# Patient Record
Sex: FEMALE | Race: WHITE | NOT HISPANIC OR LATINO | URBAN - METROPOLITAN AREA
[De-identification: names, ages, dates, MRNs, and addresses within clinical notes are randomized per-mention and may not be internally consistent; named-entity substitution may affect disease eponyms.]

---

## 2019-08-30 ENCOUNTER — APPOINTMENT (OUTPATIENT)
Age: 54
Setting detail: DERMATOLOGY
End: 2019-08-30

## 2019-08-30 DIAGNOSIS — D485 NEOPLASM OF UNCERTAIN BEHAVIOR OF SKIN: ICD-10-CM

## 2019-08-30 DIAGNOSIS — D18.0 HEMANGIOMA: ICD-10-CM

## 2019-08-30 PROBLEM — L90.0 LICHEN SCLEROSUS ET ATROPHICUS: Status: ACTIVE | Noted: 2019-08-30

## 2019-08-30 PROBLEM — D48.5 NEOPLASM OF UNCERTAIN BEHAVIOR OF SKIN: Status: ACTIVE | Noted: 2019-08-30

## 2019-08-30 PROBLEM — D18.01 HEMANGIOMA OF SKIN AND SUBCUTANEOUS TISSUE: Status: ACTIVE | Noted: 2019-08-30

## 2019-08-30 PROCEDURE — OTHER PRESCRIPTION: OTHER

## 2019-08-30 PROCEDURE — 11102 TANGNTL BX SKIN SINGLE LES: CPT

## 2019-08-30 PROCEDURE — OTHER BIOPSY BY SHAVE METHOD: OTHER

## 2019-08-30 PROCEDURE — OTHER COUNSELING: OTHER

## 2019-08-30 PROCEDURE — OTHER MEDICATION COUNSELING: OTHER

## 2019-08-30 PROCEDURE — 99202 OFFICE O/P NEW SF 15 MIN: CPT | Mod: 25

## 2019-08-30 RX ORDER — CLOBETASOL PROPIONATE 0.5 MG/G
OINTMENT TOPICAL
Qty: 1 | Refills: 0 | Status: ERX | COMMUNITY
Start: 2019-08-30

## 2019-08-30 ASSESSMENT — LOCATION ZONE DERM
LOCATION ZONE: TRUNK
LOCATION ZONE: ARM

## 2019-08-30 ASSESSMENT — LOCATION DETAILED DESCRIPTION DERM
LOCATION DETAILED: PERIUMBILICAL SKIN
LOCATION DETAILED: RIGHT PROXIMAL LATERAL POSTERIOR UPPER ARM

## 2019-08-30 ASSESSMENT — LOCATION SIMPLE DESCRIPTION DERM
LOCATION SIMPLE: ABDOMEN
LOCATION SIMPLE: RIGHT POSTERIOR UPPER ARM

## 2019-08-30 NOTE — PROCEDURE: MEDICATION COUNSELING
SSKI Counseling:  I discussed with the patient the risks of SSKI including but not limited to thyroid abnormalities, metallic taste, GI upset, fever, headache, acne, arthralgias, paraesthesias, lymphadenopathy, easy bleeding, arrhythmias, and allergic reaction. Declines

## 2019-08-30 NOTE — PROCEDURE: MEDICATION COUNSELING
done done emergent procedure Birth Control Pills Pregnancy And Lactation Text: This medication should be avoided if pregnant and for the first 30 days post-partum.

## 2019-08-30 NOTE — PROCEDURE: MEDICATION COUNSELING
Drysol Pregnancy And Lactation Text: This medication is considered safe during pregnancy and breast feeding. no

## 2019-08-30 NOTE — PROCEDURE: MEDICATION COUNSELING
No Hydroquinone Counseling:  Patient advised that medication may result in skin irritation, lightening (hypopigmentation), dryness, and burning.  In the event of skin irritation, the patient was advised to reduce the amount of the drug applied or use it less frequently.  Rarely, spots that are treated with hydroquinone can become darker (pseudoochronosis).  Should this occur, patient instructed to stop medication and call the office. The patient verbalized understanding of the proper use and possible adverse effects of hydroquinone.  All of the patient's questions and concerns were addressed.

## 2019-08-30 NOTE — PROCEDURE: MEDICATION COUNSELING
Xelchancez Pregnancy And Lactation Text: This medication is Pregnancy Category D and is not considered safe during pregnancy.  The risk during breast feeding is also uncertain.

## 2019-08-30 NOTE — PROCEDURE: BIOPSY BY SHAVE METHOD
Bill For Surgical Tray: no
Detail Level: Detailed
Cryotherapy Text: The wound bed was treated with cryotherapy after the biopsy was performed.
Anesthesia Volume In Cc (Will Not Render If 0): 0.5
Dressing: bandage
Electrodesiccation And Curettage Text: The wound bed was treated with electrodesiccation and curettage after the biopsy was performed.
X Size Of Lesion In Cm: 0
Biopsy Type: H and E
Post-Care Instructions: I reviewed with the patient in detail post-care instructions. Patient is to keep the biopsy site dry overnight, and then apply bacitracin twice daily until healed. Patient may apply hydrogen peroxide soaks to remove any crusting.
Size Of Lesion In Cm: 0.7
Hemostasis: Drysol
Biopsy Method: Dermablade
Billing Type: Third-Party Bill
Anesthesia Type: 1% lidocaine with epinephrine
Depth Of Biopsy: dermis
Wound Care: Petrolatum
Silver Nitrate Text: The wound bed was treated with silver nitrate after the biopsy was performed.
Type Of Destruction Used: Curettage
Notification Instructions: Patient will be notified of biopsy results. However, patient instructed to call the office if not contacted within 2 weeks.
Consent: Written consent was obtained and risks were reviewed including but not limited to scarring, infection, bleeding, scabbing, incomplete removal, nerve damage and allergy to anesthesia.
Curettage Text: The wound bed was treated with curettage after the biopsy was performed.
Electrodesiccation Text: The wound bed was treated with electrodesiccation after the biopsy was performed.
Was A Bandage Applied: Yes

## 2019-10-10 ENCOUNTER — APPOINTMENT (OUTPATIENT)
Age: 54
Setting detail: DERMATOLOGY
End: 2019-10-10

## 2019-10-10 DIAGNOSIS — L81.4 OTHER MELANIN HYPERPIGMENTATION: ICD-10-CM

## 2019-10-10 PROBLEM — L90.0 LICHEN SCLEROSUS ET ATROPHICUS: Status: ACTIVE | Noted: 2019-10-10

## 2019-10-10 PROCEDURE — OTHER COUNSELING: OTHER

## 2019-10-10 PROCEDURE — OTHER TREATMENT REGIMEN: OTHER

## 2019-10-10 PROCEDURE — OTHER MEDICATION COUNSELING: OTHER

## 2019-10-10 PROCEDURE — OTHER PRESCRIPTION: OTHER

## 2019-10-10 PROCEDURE — 99213 OFFICE O/P EST LOW 20 MIN: CPT

## 2019-10-10 RX ORDER — CLOBETASOL PROPIONATE 0.5 MG/G
OINTMENT TOPICAL
Qty: 1 | Refills: 1 | Status: ERX | COMMUNITY
Start: 2019-10-10

## 2019-10-10 ASSESSMENT — LOCATION ZONE DERM: LOCATION ZONE: ARM

## 2019-10-10 ASSESSMENT — LOCATION SIMPLE DESCRIPTION DERM: LOCATION SIMPLE: RIGHT POSTERIOR UPPER ARM

## 2019-10-10 ASSESSMENT — LOCATION DETAILED DESCRIPTION DERM: LOCATION DETAILED: RIGHT PROXIMAL LATERAL POSTERIOR UPPER ARM

## 2019-10-10 NOTE — PROCEDURE: TREATMENT REGIMEN
Plan: Detailed hand written instructions given to patient.
Detail Level: Zone
Continue Regimen: Clobetasol ointment 0.05% Apply to more raised lesions every night under occlusion. Apply to all other lesions TIW only.

## 2019-10-10 NOTE — PROCEDURE: MEDICATION COUNSELING
Rx topical antihistamine. Hydroquinone Counseling:  Patient advised that medication may result in skin irritation, lightening (hypopigmentation), dryness, and burning.  In the event of skin irritation, the patient was advised to reduce the amount of the drug applied or use it less frequently.  Rarely, spots that are treated with hydroquinone can become darker (pseudoochronosis).  Should this occur, patient instructed to stop medication and call the office. The patient verbalized understanding of the proper use and possible adverse effects of hydroquinone.  All of the patient's questions and concerns were addressed.

## 2020-04-24 NOTE — PROCEDURE: MEDICATION COUNSELING
----- Message from Pavithra Gonzalez MD sent at 4/24/2020 10:49 AM CDT -----  Call son and tell him she is advised to take her blood thinners during last visit  ----- Message -----  From: Stephanie Mendes MA  Sent: 4/23/2020   5:20 PM CDT  To: Pavithra Gonzalez MD    Pt's son contacted me and is worried about his mother, stated she stopped taking her blood thinners and is having some problems now in her legs. Informed him I do not do that he should contact her cardiologist, he said she does not see one. Can you please contact pt's son and set up a appointment. You may leave detailed message thank you       Ketoconazole Pregnancy And Lactation Text: This medication is Pregnancy Category C and it isn't know if it is safe during pregnancy. It is also excreted in breast milk and breast feeding isn't recommended.

## 2025-03-18 NOTE — PROCEDURE: MEDICATION COUNSELING
ORAL APPLIANCE THERAPY FOR OBSTRUCTIVE SLEEP APNEA    The American Academy of Sleep Medicine recommends oral appliance therapy for patients that suffer from mild to moderate Obstructive Sleep Apnea, or for patients that are intolerant of CPAP.     Please contact [facility] to set up a consultation: [phone]  Multiple locations in the [facility] including: [address] [place], [place] [address] [place]  [email or web address]  Fax: [phone]      Mouthpieces for Sleep Apnea (\"Mandibular Advancement Device\" or MAD)  For simple snoring or mild to moderate apnea, a special mouthpiece may help. A dental specialist works with your healthcare provider to build and fit a mouthpiece just for you. A follow-up sleep study checks how well the device is working for you. Mouthpieces are also called oral appliances.    Moving the jaw forward  Most mouthpieces move the jaw and tongue forward. That keeps the tongue from blocking the airway. Mouthpieces can work well, but they are not for everyone. Work with your healthcare provider to get a mouthpiece that fits just right for you. Oral appliances are usually custom made for you by a dental professional. Avoid using over-the-counter mouthpieces--they often don't work.  Tips  To have the most success with your mouthpiece, keep these tips in mind:  It will take some time to get used to wearing a mouthpiece. At first it may feel uncomfortable or make your mouth water. If these problems last, tell your healthcare provider.  Expect several rounds of adjustments to get the mouthpiece to fit and work just right for you.  Mouthpieces don’t cure the problems that cause snoring or sleep apnea. So you need to use your mouthpiece all night, every night.  Follow your healthcare provider’s instructions for keeping the mouthpiece clean.  When you’re not wearing your mouthpiece, store it in its case.  Date Last Reviewed: 8/1/2017  © 1055-1991 [employer]  [employer]. [address] [place] [address], [place] [address]. All rights reserved. This information is not intended as a substitute for professional medical care. Always follow your healthcare professional's instructions.         Moving the jaw and tongue forward with a mouthpiece can open the airway to reduce sleep apnea.       Protopic Counseling: Patient may experience a mild burning sensation during topical application. Protopic is not approved in children less than 2 years of age. There have been case reports of hematologic and skin malignancies in patients using topical calcineurin inhibitors although causality is questionable.